# Patient Record
Sex: MALE | URBAN - METROPOLITAN AREA
[De-identification: names, ages, dates, MRNs, and addresses within clinical notes are randomized per-mention and may not be internally consistent; named-entity substitution may affect disease eponyms.]

---

## 2019-01-23 ENCOUNTER — OFFICE VISIT (OUTPATIENT)
Dept: URBAN - METROPOLITAN AREA CLINIC 58 | Facility: CLINIC | Age: 29
End: 2019-01-23
Payer: COMMERCIAL

## 2019-01-23 DIAGNOSIS — H52.13 MYOPIA, BILATERAL: Primary | ICD-10-CM

## 2019-01-23 PROCEDURE — 92310 CONTACT LENS FITTING OU: CPT | Performed by: OPTOMETRIST

## 2019-01-23 PROCEDURE — 92004 COMPRE OPH EXAM NEW PT 1/>: CPT | Performed by: OPTOMETRIST

## 2019-01-23 PROCEDURE — 92015 DETERMINE REFRACTIVE STATE: CPT | Performed by: OPTOMETRIST

## 2019-01-23 ASSESSMENT — VISUAL ACUITY
OS: 20/20
OD: 20/20

## 2019-01-23 ASSESSMENT — KERATOMETRY
OD: 43.25
OS: 43.63

## 2019-01-23 ASSESSMENT — INTRAOCULAR PRESSURE
OD: 15
OS: 17

## 2019-01-23 NOTE — IMPRESSION/PLAN
Impression: Myopia, bilateral: H52.13. Plan: SRx and CLRx released, monitor  yearly, pt edu. pt to rtc if any problems with contacts or glasses.